# Patient Record
Sex: MALE | Race: WHITE | NOT HISPANIC OR LATINO | URBAN - METROPOLITAN AREA
[De-identification: names, ages, dates, MRNs, and addresses within clinical notes are randomized per-mention and may not be internally consistent; named-entity substitution may affect disease eponyms.]

---

## 2024-07-16 ENCOUNTER — EMERGENCY (EMERGENCY)
Facility: HOSPITAL | Age: 89
LOS: 1 days | Discharge: HOME | End: 2024-07-16
Attending: STUDENT IN AN ORGANIZED HEALTH CARE EDUCATION/TRAINING PROGRAM
Payer: MEDICARE

## 2024-07-16 VITALS
RESPIRATION RATE: 16 BRPM | DIASTOLIC BLOOD PRESSURE: 86 MMHG | SYSTOLIC BLOOD PRESSURE: 162 MMHG | HEART RATE: 73 BPM | OXYGEN SATURATION: 97 %

## 2024-07-16 VITALS
OXYGEN SATURATION: 96 % | DIASTOLIC BLOOD PRESSURE: 103 MMHG | RESPIRATION RATE: 16 BRPM | TEMPERATURE: 98 F | WEIGHT: 190.04 LBS | HEART RATE: 85 BPM | HEIGHT: 70 IN | SYSTOLIC BLOOD PRESSURE: 180 MMHG

## 2024-07-16 LAB
ALBUMIN SERPL ELPH-MCNC: 3.8 G/DL — SIGNIFICANT CHANGE UP (ref 3.3–5.2)
ALP SERPL-CCNC: 71 U/L — SIGNIFICANT CHANGE UP (ref 40–120)
ALT FLD-CCNC: 10 U/L — SIGNIFICANT CHANGE UP
ANION GAP SERPL CALC-SCNC: 13 MMOL/L — SIGNIFICANT CHANGE UP (ref 5–17)
AST SERPL-CCNC: 23 U/L — SIGNIFICANT CHANGE UP
BASOPHILS # BLD AUTO: 0.04 K/UL — SIGNIFICANT CHANGE UP (ref 0–0.2)
BASOPHILS NFR BLD AUTO: 0.4 % — SIGNIFICANT CHANGE UP (ref 0–2)
BILIRUB SERPL-MCNC: 0.6 MG/DL — SIGNIFICANT CHANGE UP (ref 0.4–2)
BUN SERPL-MCNC: 15.7 MG/DL — SIGNIFICANT CHANGE UP (ref 8–20)
CALCIUM SERPL-MCNC: 9.4 MG/DL — SIGNIFICANT CHANGE UP (ref 8.4–10.5)
CHLORIDE SERPL-SCNC: 93 MMOL/L — LOW (ref 96–108)
CO2 SERPL-SCNC: 26 MMOL/L — SIGNIFICANT CHANGE UP (ref 22–29)
CREAT SERPL-MCNC: 0.84 MG/DL — SIGNIFICANT CHANGE UP (ref 0.5–1.3)
EGFR: 80 ML/MIN/1.73M2 — SIGNIFICANT CHANGE UP
EOSINOPHIL # BLD AUTO: 0 K/UL — SIGNIFICANT CHANGE UP (ref 0–0.5)
EOSINOPHIL NFR BLD AUTO: 0 % — SIGNIFICANT CHANGE UP (ref 0–6)
GLUCOSE SERPL-MCNC: 110 MG/DL — HIGH (ref 70–99)
HCT VFR BLD CALC: 34.1 % — LOW (ref 39–50)
HCT VFR BLD CALC: 35.2 % — LOW (ref 39–50)
HGB BLD-MCNC: 11.8 G/DL — LOW (ref 13–17)
HGB BLD-MCNC: 12.5 G/DL — LOW (ref 13–17)
IMM GRANULOCYTES NFR BLD AUTO: 0.5 % — SIGNIFICANT CHANGE UP (ref 0–0.9)
LIDOCAIN IGE QN: 40 U/L — SIGNIFICANT CHANGE UP (ref 22–51)
LYMPHOCYTES # BLD AUTO: 1.61 K/UL — SIGNIFICANT CHANGE UP (ref 1–3.3)
LYMPHOCYTES # BLD AUTO: 16.8 % — SIGNIFICANT CHANGE UP (ref 13–44)
MCHC RBC-ENTMCNC: 30.6 PG — SIGNIFICANT CHANGE UP (ref 27–34)
MCHC RBC-ENTMCNC: 30.9 PG — SIGNIFICANT CHANGE UP (ref 27–34)
MCHC RBC-ENTMCNC: 34.6 GM/DL — SIGNIFICANT CHANGE UP (ref 32–36)
MCHC RBC-ENTMCNC: 35.5 GM/DL — SIGNIFICANT CHANGE UP (ref 32–36)
MCV RBC AUTO: 86.9 FL — SIGNIFICANT CHANGE UP (ref 80–100)
MCV RBC AUTO: 88.3 FL — SIGNIFICANT CHANGE UP (ref 80–100)
MONOCYTES # BLD AUTO: 0.83 K/UL — SIGNIFICANT CHANGE UP (ref 0–0.9)
MONOCYTES NFR BLD AUTO: 8.7 % — SIGNIFICANT CHANGE UP (ref 2–14)
NEUTROPHILS # BLD AUTO: 7.06 K/UL — SIGNIFICANT CHANGE UP (ref 1.8–7.4)
NEUTROPHILS NFR BLD AUTO: 73.6 % — SIGNIFICANT CHANGE UP (ref 43–77)
PLATELET # BLD AUTO: 200 K/UL — SIGNIFICANT CHANGE UP (ref 150–400)
PLATELET # BLD AUTO: 213 K/UL — SIGNIFICANT CHANGE UP (ref 150–400)
POTASSIUM SERPL-MCNC: 3.5 MMOL/L — SIGNIFICANT CHANGE UP (ref 3.5–5.3)
POTASSIUM SERPL-SCNC: 3.5 MMOL/L — SIGNIFICANT CHANGE UP (ref 3.5–5.3)
PROT SERPL-MCNC: 6.8 G/DL — SIGNIFICANT CHANGE UP (ref 6.6–8.7)
RBC # BLD: 3.86 M/UL — LOW (ref 4.2–5.8)
RBC # BLD: 4.05 M/UL — LOW (ref 4.2–5.8)
RBC # FLD: 13.2 % — SIGNIFICANT CHANGE UP (ref 10.3–14.5)
RBC # FLD: 13.4 % — SIGNIFICANT CHANGE UP (ref 10.3–14.5)
SODIUM SERPL-SCNC: 132 MMOL/L — LOW (ref 135–145)
WBC # BLD: 10.2 K/UL — SIGNIFICANT CHANGE UP (ref 3.8–10.5)
WBC # BLD: 9.59 K/UL — SIGNIFICANT CHANGE UP (ref 3.8–10.5)
WBC # FLD AUTO: 10.2 K/UL — SIGNIFICANT CHANGE UP (ref 3.8–10.5)
WBC # FLD AUTO: 9.59 K/UL — SIGNIFICANT CHANGE UP (ref 3.8–10.5)

## 2024-07-16 PROCEDURE — 71045 X-RAY EXAM CHEST 1 VIEW: CPT | Mod: 26

## 2024-07-16 PROCEDURE — 74177 CT ABD & PELVIS W/CONTRAST: CPT | Mod: MC

## 2024-07-16 PROCEDURE — 99284 EMERGENCY DEPT VISIT MOD MDM: CPT | Mod: 25

## 2024-07-16 PROCEDURE — 74177 CT ABD & PELVIS W/CONTRAST: CPT | Mod: 26,MC

## 2024-07-16 PROCEDURE — 96374 THER/PROPH/DIAG INJ IV PUSH: CPT | Mod: XU

## 2024-07-16 PROCEDURE — 85025 COMPLETE CBC W/AUTO DIFF WBC: CPT

## 2024-07-16 PROCEDURE — 83690 ASSAY OF LIPASE: CPT

## 2024-07-16 PROCEDURE — 36415 COLL VENOUS BLD VENIPUNCTURE: CPT

## 2024-07-16 PROCEDURE — 99285 EMERGENCY DEPT VISIT HI MDM: CPT | Mod: GC

## 2024-07-16 PROCEDURE — 85027 COMPLETE CBC AUTOMATED: CPT

## 2024-07-16 PROCEDURE — 80053 COMPREHEN METABOLIC PANEL: CPT

## 2024-07-16 PROCEDURE — 96375 TX/PRO/DX INJ NEW DRUG ADDON: CPT

## 2024-07-16 PROCEDURE — 71045 X-RAY EXAM CHEST 1 VIEW: CPT

## 2024-07-16 RX ORDER — SODIUM CHLORIDE 0.9 % (FLUSH) 0.9 %
1000 SYRINGE (ML) INJECTION ONCE
Refills: 0 | Status: COMPLETED | OUTPATIENT
Start: 2024-07-16 | End: 2024-07-16

## 2024-07-16 RX ORDER — DICYCLOMINE HCL 10 MG
10 CAPSULE ORAL ONCE
Refills: 0 | Status: DISCONTINUED | OUTPATIENT
Start: 2024-07-16 | End: 2024-07-23

## 2024-07-16 RX ORDER — ACETAMINOPHEN 325 MG
1000 TABLET ORAL ONCE
Refills: 0 | Status: COMPLETED | OUTPATIENT
Start: 2024-07-16 | End: 2024-07-16

## 2024-07-16 RX ORDER — SENNOSIDES 8.6 MG
2 TABLET ORAL ONCE
Refills: 0 | Status: COMPLETED | OUTPATIENT
Start: 2024-07-16 | End: 2024-07-16

## 2024-07-16 RX ORDER — ONDANSETRON HYDROCHLORIDE 2 MG/ML
4 INJECTION INTRAMUSCULAR; INTRAVENOUS ONCE
Refills: 0 | Status: COMPLETED | OUTPATIENT
Start: 2024-07-16 | End: 2024-07-16

## 2024-07-16 RX ORDER — IOHEXOL 350 MG/ML
30 INJECTION, SOLUTION INTRAVENOUS ONCE
Refills: 0 | Status: COMPLETED | OUTPATIENT
Start: 2024-07-16 | End: 2024-07-16

## 2024-07-16 RX ORDER — MAGNESIUM CITRATE 1.75 G/29.6ML
296 LIQUID ORAL ONCE
Refills: 0 | Status: COMPLETED | OUTPATIENT
Start: 2024-07-16 | End: 2024-07-16

## 2024-07-16 RX ADMIN — Medication 1000 MILLILITER(S): at 09:26

## 2024-07-16 RX ADMIN — ONDANSETRON HYDROCHLORIDE 4 MILLIGRAM(S): 2 INJECTION INTRAMUSCULAR; INTRAVENOUS at 09:26

## 2024-07-16 RX ADMIN — Medication 400 MILLIGRAM(S): at 09:26

## 2024-07-16 RX ADMIN — IOHEXOL 30 MILLILITER(S): 350 INJECTION, SOLUTION INTRAVENOUS at 09:26

## 2024-07-16 RX ADMIN — Medication 2 TABLET(S): at 12:39

## 2024-07-16 RX ADMIN — MAGNESIUM CITRATE 296 MILLILITER(S): 1.75 LIQUID ORAL at 12:40

## 2024-07-16 NOTE — ED PROVIDER NOTE - PATIENT PORTAL LINK FT
You can access the FollowMyHealth Patient Portal offered by [facility] by registering at the following website: [email or web address]. By joining [employer]’s FollowMyHealth portal, you will also be able to view your health information using other applications (apps) compatible with our system.

## 2024-07-16 NOTE — ED PROVIDER NOTE - CLINICAL SUMMARY MEDICAL DECISION MAKING FREE TEXT BOX
89 year old male presenting with generalized abdominal pain with complaints of constipation x 4 days. No obstruction/other acute findings on CT imaging to explain patient's pain. Incidental small perisplenic subcapsular hematoma measures 1 cm in thickness reported on CT. Patient denies recent falls, traumas, any possible reason for a perisplenic hematoma. hgb trended 12.5 > 11.8 after 1L NS, likely dilutional. vital signs stable. Discussed with patient and family the need to f/u for this findings, and strict return precautions. Supportive care discussed for constipation. Stable for dc home.

## 2024-07-16 NOTE — ED PROVIDER NOTE - PHYSICAL EXAMINATION
Gen: well appearing elderly male, in mild to moderate respiratory distress  Head: normocephalic, atraumatic  EENT: EOMI, moist mucous membranes  Lung: no increased work of breathing, clear to auscultation bilaterally  CV: regular rate, regular rhythm  Abd: soft, non distended, (+)generalized tenderness to palpation  MSK: No edema, no visible deformities, full range of motion in all 4 extremities  Neuro: Awake, alert, no focal neurologic deficits

## 2024-07-16 NOTE — ED ADULT TRIAGE NOTE - CHIEF COMPLAINT QUOTE
lower abdominal pain and constipation x few days. alert and o to self and place. at baseline as per wife.

## 2024-07-16 NOTE — ED PROVIDER NOTE - ATTENDING CONTRIBUTION TO CARE
I have personally performed a history and physical examination of the patient and discussed management with the resident as well as the patient.  I reviewed the resident's note and agree with the documented findings and plan of care.  I have authored and modified critical sections of the Provider Note, including but not limited to HPI, Physical Exam and MDM.    89 year old male with PMHx HLD, constipation, presenting for evaluation of 4 days of abdominal pain, constipation. Patient states last BM was 4 days ago, does not remember when he passed gas last.  No abdominal surgical history.  As per independent chart review patient recently admitted for pneumonia.  Consider volvulus versus SBO versus functional constipation.  Will obtain CBC, CMP, CTAP with oral and IV contrast.  CT shows no evidence of obstruction.  Case discussed with attending radiologist by myself Dr. Nolasco, patient has a small perisplenic subcapsular hematoma.  No recent trauma reported by patient, results as close and recommending outpatient follow-up.  Patient given medications for symptom and is requesting discharge at this time.

## 2024-07-16 NOTE — ED PROVIDER NOTE - CARE PROVIDERS DIRECT ADDRESSES
[email or web address]

## 2024-07-16 NOTE — ED PROVIDER NOTE - NSFOLLOWUPINSTRUCTIONS_ED_ALL_ED_FT
- Stay well hydrated  - You may take over the counter stool softeners for your symptoms (Colace, Miralax, Dulcolax, etc).   - We incidentally found a small perisplenic subcapsular hematoma measuring 1 cm in thickness on your CT scan. Because your blood work and vital signs were stable, in addition to the lack of recent trauma, there is no indication to further pursue this as it is unlikely the cause of your pain. Please make sure to follow up with your primary care doctor for this findings.     If you develop any concerning symptoms, please return to the emergency department

## 2024-07-16 NOTE — ED PROVIDER NOTE - OBJECTIVE STATEMENT
89 year old male with PMHx HLD, constipation, presenting for evaluation of 4 days of abdominal pain, constipation. Patient states last BM was 4 days ago, does not remember when he passed gas last. Also endorsing pain to lower abdomen with decreased appetite. No hx abdominal surgeries. Family notes patient with recent admission to hospital for pneumonia. Denies nausea, vomiting, fever, dysuria.

## 2024-07-19 DIAGNOSIS — R10.30 LOWER ABDOMINAL PAIN, UNSPECIFIED: ICD-10-CM

## 2024-07-19 DIAGNOSIS — K59.00 CONSTIPATION, UNSPECIFIED: ICD-10-CM
